# Patient Record
Sex: MALE | URBAN - METROPOLITAN AREA
[De-identification: names, ages, dates, MRNs, and addresses within clinical notes are randomized per-mention and may not be internally consistent; named-entity substitution may affect disease eponyms.]

---

## 2023-04-14 ENCOUNTER — TELEPHONE ENCOUNTER (OUTPATIENT)
Dept: URBAN - METROPOLITAN AREA CLINIC 113 | Facility: CLINIC | Age: 58
End: 2023-04-14

## 2023-04-14 RX ORDER — FAMOTIDINE 40 MG/1
1 TABLET AT BEDTIME TABLET, FILM COATED ORAL ONCE A DAY
Qty: 90 TABLET | Refills: 1 | OUTPATIENT
Start: 2023-04-17

## 2023-05-16 ENCOUNTER — OFFICE VISIT (OUTPATIENT)
Dept: URBAN - METROPOLITAN AREA CLINIC 113 | Facility: CLINIC | Age: 58
End: 2023-05-16

## 2023-05-16 RX ORDER — FAMOTIDINE 40 MG/1
1 TABLET AT BEDTIME TABLET, FILM COATED ORAL ONCE A DAY
Qty: 90 TABLET | Refills: 1 | Status: ACTIVE | COMMUNITY
Start: 2023-04-17

## 2023-05-16 NOTE — HPI-TODAY'S VISIT:
57-year-old male presents for evaluation of stomach pain.  He was portably seen at [facility] ED and was prescribed famotidine.  A refill has been requested.  We do not have records for review.

## 2023-07-11 ENCOUNTER — OFFICE VISIT (OUTPATIENT)
Dept: URBAN - METROPOLITAN AREA CLINIC 113 | Facility: CLINIC | Age: 58
End: 2023-07-11

## 2023-07-11 NOTE — HPI-TODAY'S VISIT:
57-year-old male presents for evaluation of abdominal pain.  He was seen in the ED in March due to worsening abdominal..  Labs revealed normal CBC, CMP and lipase.  CT scan of the abdomen/pelvis revealed punctate nonobstructing bilateral renal calculi otherwise unremarkable.  Symptoms were concerning for possible reflux and he was started on Pepcid and Maalox.  He was to follow-up with GI outpatient for further work-up if necessary.